# Patient Record
Sex: MALE | Race: WHITE | Employment: UNEMPLOYED | URBAN - METROPOLITAN AREA
[De-identification: names, ages, dates, MRNs, and addresses within clinical notes are randomized per-mention and may not be internally consistent; named-entity substitution may affect disease eponyms.]

---

## 2018-03-12 PROBLEM — J30.1 NON-SEASONAL ALLERGIC RHINITIS DUE TO POLLEN: Status: ACTIVE | Noted: 2018-03-12

## 2018-07-24 ENCOUNTER — APPOINTMENT (OUTPATIENT)
Dept: GENERAL RADIOLOGY | Age: 48
End: 2018-07-24
Attending: EMERGENCY MEDICINE
Payer: COMMERCIAL

## 2018-07-24 ENCOUNTER — HOSPITAL ENCOUNTER (EMERGENCY)
Age: 48
Discharge: HOME | End: 2018-07-24
Attending: EMERGENCY MEDICINE
Payer: COMMERCIAL

## 2018-07-24 VITALS
RESPIRATION RATE: 18 BRPM | WEIGHT: 205 LBS | OXYGEN SATURATION: 98 % | DIASTOLIC BLOOD PRESSURE: 107 MMHG | TEMPERATURE: 97 F | SYSTOLIC BLOOD PRESSURE: 172 MMHG | HEART RATE: 61 BPM | HEIGHT: 73 IN | BODY MASS INDEX: 27.17 KG/M2

## 2018-07-24 DIAGNOSIS — N20.0 KIDNEY STONE: Primary | ICD-10-CM

## 2018-07-24 LAB
ALBUMIN SERPL-MCNC: 4.2 G/DL (ref 3.5–4.8)
ALBUMIN/GLOB SERPL: 1.4 {RATIO} (ref 1–2)
ALP LIVER SERPL-CCNC: 47 U/L (ref 45–117)
ALT SERPL-CCNC: 27 U/L (ref 17–63)
ANION GAP SERPL CALC-SCNC: 8 MMOL/L (ref 0–18)
AST SERPL-CCNC: 16 U/L (ref 15–41)
BASOPHILS # BLD AUTO: 0.06 X10(3) UL (ref 0–0.1)
BASOPHILS NFR BLD AUTO: 0.8 %
BILIRUB SERPL-MCNC: 0.5 MG/DL (ref 0.1–2)
BILIRUB UR QL STRIP.AUTO: NEGATIVE
BUN BLD-MCNC: 14 MG/DL (ref 8–20)
BUN/CREAT SERPL: 12.8 (ref 10–20)
CALCIUM BLD-MCNC: 8.9 MG/DL (ref 8.3–10.3)
CHLORIDE SERPL-SCNC: 104 MMOL/L (ref 101–111)
CO2 SERPL-SCNC: 26 MMOL/L (ref 22–32)
COLOR UR AUTO: YELLOW
CREAT BLD-MCNC: 1.09 MG/DL (ref 0.7–1.3)
EOSINOPHIL # BLD AUTO: 0.29 X10(3) UL (ref 0–0.3)
EOSINOPHIL NFR BLD AUTO: 3.8 %
ERYTHROCYTE [DISTWIDTH] IN BLOOD BY AUTOMATED COUNT: 11.6 % (ref 11.5–16)
GLOBULIN PLAS-MCNC: 3.1 G/DL (ref 2.5–3.7)
GLUCOSE BLD-MCNC: 125 MG/DL (ref 70–99)
GLUCOSE UR STRIP.AUTO-MCNC: NEGATIVE MG/DL
HCT VFR BLD AUTO: 47 % (ref 37–53)
HGB BLD-MCNC: 16.3 G/DL (ref 13–17)
IMMATURE GRANULOCYTE COUNT: 0.04 X10(3) UL (ref 0–1)
IMMATURE GRANULOCYTE RATIO %: 0.5 %
LEUKOCYTE ESTERASE UR QL STRIP.AUTO: NEGATIVE
LYMPHOCYTES # BLD AUTO: 2.34 X10(3) UL (ref 0.9–4)
LYMPHOCYTES NFR BLD AUTO: 30.5 %
M PROTEIN MFR SERPL ELPH: 7.3 G/DL (ref 6.1–8.3)
MCH RBC QN AUTO: 29.1 PG (ref 27–33.2)
MCHC RBC AUTO-ENTMCNC: 34.7 G/DL (ref 31–37)
MCV RBC AUTO: 83.9 FL (ref 80–99)
MONOCYTES # BLD AUTO: 0.78 X10(3) UL (ref 0.1–1)
MONOCYTES NFR BLD AUTO: 10.2 %
NEUTROPHIL ABS PRELIM: 4.15 X10 (3) UL (ref 1.3–6.7)
NEUTROPHILS # BLD AUTO: 4.15 X10(3) UL (ref 1.3–6.7)
NEUTROPHILS NFR BLD AUTO: 54.2 %
NITRITE UR QL STRIP.AUTO: NEGATIVE
OSMOLALITY SERPL CALC.SUM OF ELEC: 288 MOSM/KG (ref 275–295)
PH UR STRIP.AUTO: 5.5 [PH] (ref 4.5–8)
PLATELET # BLD AUTO: 266 10(3)UL (ref 150–450)
POTASSIUM SERPL-SCNC: 3.7 MMOL/L (ref 3.6–5.1)
RBC # BLD AUTO: 5.6 X10(6)UL (ref 4.3–5.7)
RBC #/AREA URNS AUTO: >10 /HPF
RED CELL DISTRIBUTION WIDTH-SD: 35.2 FL (ref 35.1–46.3)
SODIUM SERPL-SCNC: 138 MMOL/L (ref 136–144)
SP GR UR STRIP.AUTO: 1.02 (ref 1–1.03)
UROBILINOGEN UR STRIP.AUTO-MCNC: 0.2 MG/DL
WBC # BLD AUTO: 7.7 X10(3) UL (ref 4–13)

## 2018-07-24 PROCEDURE — 99284 EMERGENCY DEPT VISIT MOD MDM: CPT

## 2018-07-24 PROCEDURE — 81001 URINALYSIS AUTO W/SCOPE: CPT | Performed by: EMERGENCY MEDICINE

## 2018-07-24 PROCEDURE — 74018 RADEX ABDOMEN 1 VIEW: CPT | Performed by: EMERGENCY MEDICINE

## 2018-07-24 PROCEDURE — 96374 THER/PROPH/DIAG INJ IV PUSH: CPT

## 2018-07-24 PROCEDURE — 96375 TX/PRO/DX INJ NEW DRUG ADDON: CPT

## 2018-07-24 PROCEDURE — 85025 COMPLETE CBC W/AUTO DIFF WBC: CPT | Performed by: EMERGENCY MEDICINE

## 2018-07-24 PROCEDURE — 96376 TX/PRO/DX INJ SAME DRUG ADON: CPT

## 2018-07-24 PROCEDURE — 96361 HYDRATE IV INFUSION ADD-ON: CPT

## 2018-07-24 PROCEDURE — 80053 COMPREHEN METABOLIC PANEL: CPT | Performed by: EMERGENCY MEDICINE

## 2018-07-24 RX ORDER — KETOROLAC TROMETHAMINE 30 MG/ML
30 INJECTION, SOLUTION INTRAMUSCULAR; INTRAVENOUS ONCE
Status: COMPLETED | OUTPATIENT
Start: 2018-07-24 | End: 2018-07-24

## 2018-07-24 RX ORDER — TRAMADOL HYDROCHLORIDE 50 MG/1
50 TABLET ORAL EVERY 6 HOURS PRN
Qty: 10 TABLET | Refills: 0 | Status: SHIPPED | OUTPATIENT
Start: 2018-07-24 | End: 2018-07-27

## 2018-07-24 RX ORDER — MORPHINE SULFATE 10 MG/ML
10 INJECTION, SOLUTION INTRAMUSCULAR; INTRAVENOUS EVERY 30 MIN PRN
Status: DISCONTINUED | OUTPATIENT
Start: 2018-07-24 | End: 2018-07-24

## 2018-07-24 RX ORDER — ONDANSETRON 4 MG/1
4 TABLET, ORALLY DISINTEGRATING ORAL EVERY 4 HOURS PRN
Qty: 20 TABLET | Refills: 0 | Status: SHIPPED | OUTPATIENT
Start: 2018-07-24 | End: 2018-07-29

## 2018-07-24 RX ORDER — ONDANSETRON 2 MG/ML
4 INJECTION INTRAMUSCULAR; INTRAVENOUS ONCE
Status: COMPLETED | OUTPATIENT
Start: 2018-07-24 | End: 2018-07-24

## 2018-07-24 NOTE — PROGRESS NOTES
Future Appointments  Date Time Provider Henry Bowling   7/30/2018 3:45 PM [facility]

## 2018-07-24 NOTE — ED PROVIDER NOTES
Patient Seen in: THE [facility] Emergency Department In [place]    History   Patient presents with:  Abdomen/Flank Pain (GI/)    Stated Complaint: flank    HPI    Patient is a 40-year-old male comes emergency room for evaluation of flank pain.   Symptoms sta HEENT: Normocephalic, atraumatic. Moist mucous membranes. Pupils equal round reactive to light and accommodation, extraocular motion is intact, sclerae white, conjunctiva is pink. Oropharynx is unremarkable, no exudate.   NECK: Supple, trachea midline, results for these tests on the individual orders.    RAINBOW DRAW BLUE   RAINBOW DRAW LAVENDER   RAINBOW DRAW LIGHT GREEN   RAINBOW DRAW GOLD   CBC W/ DIFFERENTIAL     KUB showed 3-4 mm calculus pelvis possible kidney stone  ED Course as of Jul 24 0538  --- Clinical Impression:  Kidney stone  (primary encounter diagnosis)    Disposition:  Discharge  7/24/2018  4:29 am    Follow-up:  Jurgen Dobbins, [address] [address]  [phone]      As needed    Arianna Hernandez MD  [address] [facility]

## 2018-09-27 PROBLEM — I10 ESSENTIAL HYPERTENSION: Status: ACTIVE | Noted: 2018-09-27

## 2019-05-20 PROBLEM — E78.5 HYPERLIPIDEMIA: Status: ACTIVE | Noted: 2019-05-20

## 2021-04-20 NOTE — ED INITIAL ASSESSMENT (HPI)
PT STS RIGHT FLANK PAIN X 1 HOUR PRIOR TO ARRIVAL WITH NAUSEA. STS HISTORY OF SIMILAR PAIN WITH DIAGNOSIS OF RENAL STONES.  DENIES URINARY C/O. home

## 2021-06-21 PROBLEM — G43.809 OTHER MIGRAINE WITHOUT STATUS MIGRAINOSUS, NOT INTRACTABLE: Status: ACTIVE | Noted: 2021-06-21

## 2021-06-21 PROBLEM — N50.82 PAIN IN SCROTUM WITHOUT TRAUMA OF SCROTUM: Status: ACTIVE | Noted: 2021-06-21

## 2021-07-06 PROBLEM — N50.3 CYST OF EPIDIDYMIS: Status: ACTIVE | Noted: 2021-07-06

## 2021-07-06 PROBLEM — L72.0 EPIDERMAL CYST: Status: ACTIVE | Noted: 2021-07-06

## 2021-07-06 PROBLEM — N20.0 NEPHROLITHIASIS: Status: ACTIVE | Noted: 2021-07-06

## 2021-07-06 PROBLEM — N43.3 HYDROCELE, UNSPECIFIED HYDROCELE TYPE: Status: ACTIVE | Noted: 2021-07-06

## 2022-01-20 PROBLEM — N50.82 PAIN IN SCROTUM WITHOUT TRAUMA OF SCROTUM: Status: RESOLVED | Noted: 2021-06-21 | Resolved: 2022-01-20

## 2022-07-28 ENCOUNTER — HOSPITAL ENCOUNTER (EMERGENCY)
Age: 52
Discharge: HOME | End: 2022-07-28
Attending: EMERGENCY MEDICINE
Payer: COMMERCIAL

## 2022-07-28 VITALS
RESPIRATION RATE: 18 BRPM | DIASTOLIC BLOOD PRESSURE: 69 MMHG | HEART RATE: 87 BPM | TEMPERATURE: 98 F | WEIGHT: 180 LBS | BODY MASS INDEX: 24.38 KG/M2 | OXYGEN SATURATION: 99 % | HEIGHT: 72 IN | SYSTOLIC BLOOD PRESSURE: 127 MMHG

## 2022-07-28 DIAGNOSIS — U07.1 COVID-19: Primary | ICD-10-CM

## 2022-07-28 PROCEDURE — 99283 EMERGENCY DEPT VISIT LOW MDM: CPT

## 2022-07-28 RX ORDER — LORATADINE 10 MG/1
10 TABLET ORAL DAILY
COMMUNITY

## 2022-07-28 RX ORDER — NIRMATRELVIR AND RITONAVIR 300-100 MG
KIT ORAL
Qty: 30 TABLET | Refills: 0 | Status: SHIPPED | OUTPATIENT
Start: 2022-07-28 | End: 2022-08-02

## 2022-07-28 NOTE — ED PROVIDER NOTES
Patient Seen in: THE [facility] Emergency Department In [place]      History   Patient presents with:  Covid    Stated Complaint: states tested positive for covid this morning, states having back pain, a sore *    Subjective:   HPI    54-year-old male presents for evaluation of COVID-like symptoms. Patient developed cough, scratchy throat, body aches and fatigue yesterday. He tested positive this morning via a home test.  Denies chest pain or shortness of breath. No vomiting, slight diarrhea. Past medical history of hypertension high cholesterol    Objective:   No pertinent past medical history. No pertinent past surgical history. No pertinent social history. Review of Systems    Positive for stated complaint: states tested positive for covid this morning, states having back pain, a sore *  Other systems are as noted in HPI. Constitutional and vital signs reviewed. All other systems reviewed and negative except as noted above. Physical Exam     ED Triage Vitals   BP    Pulse    Resp    Temp    Temp src    SpO2    O2 Device        Current:There were no vitals taken for this visit. Physical Exam    General: Alert, oriented, no apparent distress  HEENT: Atraumatic, normocephalic. Pupils equal reactive. Extraocular motions intact. Oropharynx clear. Neck: Supple  Lungs: Clear to auscultation bilaterally. Heart: Regular rate and rhythm. Abdomen: Soft, nontender. Skin: No rash. No edema. Neurologic: No focal neurologic deficits. Normal speech pattern  Musculoskeletal: No tenderness or deformity noted. ED Course   Labs Reviewed - No data to display             MDM      Well-appearing. No tachypnea nor hypoxia. Paxlovid will be prescribed. Return precautions reviewed, quarantine discussed.   Home pulse oximeter and incentive spirometer provided                                   Disposition and Plan     Clinical Impression:  COVID-19  (primary encounter diagnosis)     Disposition:  There is no disposition on file for this visit. There is no disposition time on file for this visit. Follow-up:  Nura Tapia, [facility]  [phone]    Call  As needed      The patient has evidence of COVID-19 pneumonia and low oxygen saturation. There is concern for gradual decline at home. As a result, a pulse oximetry device was given to the patient/caregiver and clear instructions relayed on how to use the device, interpret the results and when to return if worse. The patient/caregiver verbalized understanding of the instructions. Medications Prescribed:  Current Discharge Medication List    START taking these medications    nirmatrelvir & ritonavir (PAXLOVID) 20 x 150 MG & 10 x 100MG Oral Tablet Therapy Pack  Take two nirmatrelvir tablets (300mg) with one ritonavir tablet (100mg) together twice daily for 5 days.   Qty: 30 tablet Refills: 0

## 2023-11-18 ENCOUNTER — APPOINTMENT (OUTPATIENT)
Dept: CT IMAGING | Age: 53
End: 2023-11-18
Attending: EMERGENCY MEDICINE
Payer: COMMERCIAL

## 2023-11-18 ENCOUNTER — HOSPITAL ENCOUNTER (EMERGENCY)
Age: 53
Discharge: HOME | End: 2023-11-18
Attending: EMERGENCY MEDICINE
Payer: COMMERCIAL

## 2023-11-18 VITALS
RESPIRATION RATE: 17 BRPM | WEIGHT: 190 LBS | DIASTOLIC BLOOD PRESSURE: 81 MMHG | BODY MASS INDEX: 25.73 KG/M2 | HEIGHT: 72 IN | TEMPERATURE: 98 F | HEART RATE: 66 BPM | SYSTOLIC BLOOD PRESSURE: 143 MMHG | OXYGEN SATURATION: 99 %

## 2023-11-18 DIAGNOSIS — N23 RENAL COLIC: Primary | ICD-10-CM

## 2023-11-18 LAB
ALBUMIN SERPL-MCNC: 3.5 G/DL (ref 3.4–5)
ALBUMIN/GLOB SERPL: 1 {RATIO} (ref 1–2)
ALP LIVER SERPL-CCNC: 41 U/L
ALT SERPL-CCNC: 32 U/L
ANION GAP SERPL CALC-SCNC: 5 MMOL/L (ref 0–18)
AST SERPL-CCNC: 29 U/L (ref 15–37)
BASOPHILS # BLD AUTO: 0.05 X10(3) UL (ref 0–0.2)
BASOPHILS NFR BLD AUTO: 0.5 %
BILIRUB SERPL-MCNC: 0.5 MG/DL (ref 0.1–2)
BUN BLD-MCNC: 20 MG/DL (ref 9–23)
CALCIUM BLD-MCNC: 8.8 MG/DL (ref 8.5–10.1)
CHLORIDE SERPL-SCNC: 107 MMOL/L (ref 98–112)
CO2 SERPL-SCNC: 26 MMOL/L (ref 21–32)
CREAT BLD-MCNC: 1.42 MG/DL
EGFRCR SERPLBLD CKD-EPI 2021: 59 ML/MIN/1.73M2 (ref 60–?)
EOSINOPHIL # BLD AUTO: 0.23 X10(3) UL (ref 0–0.7)
EOSINOPHIL NFR BLD AUTO: 2.2 %
ERYTHROCYTE [DISTWIDTH] IN BLOOD BY AUTOMATED COUNT: 11.2 %
GLOBULIN PLAS-MCNC: 3.4 G/DL (ref 2.8–4.4)
GLUCOSE BLD-MCNC: 112 MG/DL (ref 70–99)
HCT VFR BLD AUTO: 43.8 %
HGB BLD-MCNC: 15.3 G/DL
IMM GRANULOCYTES # BLD AUTO: 0.11 X10(3) UL (ref 0–1)
IMM GRANULOCYTES NFR BLD: 1.1 %
LIPASE SERPL-CCNC: 59 U/L (ref 13–75)
LYMPHOCYTES # BLD AUTO: 1.69 X10(3) UL (ref 1–4)
LYMPHOCYTES NFR BLD AUTO: 16.2 %
MCH RBC QN AUTO: 30.2 PG (ref 26–34)
MCHC RBC AUTO-ENTMCNC: 34.9 G/DL (ref 31–37)
MCV RBC AUTO: 86.4 FL
MONOCYTES # BLD AUTO: 0.99 X10(3) UL (ref 0.1–1)
MONOCYTES NFR BLD AUTO: 9.5 %
NEUTROPHILS # BLD AUTO: 7.36 X10 (3) UL (ref 1.5–7.7)
NEUTROPHILS # BLD AUTO: 7.36 X10(3) UL (ref 1.5–7.7)
NEUTROPHILS NFR BLD AUTO: 70.5 %
OSMOLALITY SERPL CALC.SUM OF ELEC: 289 MOSM/KG (ref 275–295)
PLATELET # BLD AUTO: 239 10(3)UL (ref 150–450)
POTASSIUM SERPL-SCNC: 4.7 MMOL/L (ref 3.5–5.1)
PROT SERPL-MCNC: 6.9 G/DL (ref 6.4–8.2)
RBC # BLD AUTO: 5.07 X10(6)UL
SODIUM SERPL-SCNC: 138 MMOL/L (ref 136–145)
WBC # BLD AUTO: 10.4 X10(3) UL (ref 4–11)

## 2023-11-18 PROCEDURE — 99284 EMERGENCY DEPT VISIT MOD MDM: CPT

## 2023-11-18 PROCEDURE — 96374 THER/PROPH/DIAG INJ IV PUSH: CPT

## 2023-11-18 PROCEDURE — 80053 COMPREHEN METABOLIC PANEL: CPT | Performed by: EMERGENCY MEDICINE

## 2023-11-18 PROCEDURE — 99285 EMERGENCY DEPT VISIT HI MDM: CPT

## 2023-11-18 PROCEDURE — 74176 CT ABD & PELVIS W/O CONTRAST: CPT | Performed by: EMERGENCY MEDICINE

## 2023-11-18 PROCEDURE — 85025 COMPLETE CBC W/AUTO DIFF WBC: CPT | Performed by: EMERGENCY MEDICINE

## 2023-11-18 PROCEDURE — 83690 ASSAY OF LIPASE: CPT | Performed by: EMERGENCY MEDICINE

## 2023-11-18 RX ORDER — TAMSULOSIN HYDROCHLORIDE 0.4 MG/1
0.4 CAPSULE ORAL DAILY
Qty: 7 CAPSULE | Refills: 0 | Status: SHIPPED | OUTPATIENT
Start: 2023-11-18 | End: 2023-11-25

## 2023-11-18 RX ORDER — ONDANSETRON 4 MG/1
4 TABLET, ORALLY DISINTEGRATING ORAL EVERY 4 HOURS PRN
Qty: 10 TABLET | Refills: 0 | Status: SHIPPED | OUTPATIENT
Start: 2023-11-18 | End: 2023-11-25

## 2023-11-18 RX ORDER — HYDROCODONE BITARTRATE AND ACETAMINOPHEN 5; 325 MG/1; MG/1
1-2 TABLET ORAL EVERY 6 HOURS PRN
Qty: 10 TABLET | Refills: 0 | Status: SHIPPED | OUTPATIENT
Start: 2023-11-18 | End: 2023-11-23

## 2023-11-18 RX ORDER — KETOROLAC TROMETHAMINE 15 MG/ML
30 INJECTION, SOLUTION INTRAMUSCULAR; INTRAVENOUS ONCE
Status: COMPLETED | OUTPATIENT
Start: 2023-11-18 | End: 2023-11-18

## 2023-11-18 RX ORDER — HYDROCODONE BITARTRATE AND ACETAMINOPHEN 5; 325 MG/1; MG/1
1 TABLET ORAL ONCE
Status: COMPLETED | OUTPATIENT
Start: 2023-11-18 | End: 2023-11-18

## 2023-11-18 NOTE — DISCHARGE INSTRUCTIONS
Follow-up with [facility] SPECIALTY [facility].   Call to make a follow up appointment  Strain all urine until stone passed  Take norco 1 tablet every 6 hours as needed for pain  Take zofran as needed for nausea every 4 hours  Take flomax once a day until stone passed  Return to ER if any worsening pain, fever, or any new concern

## 2024-03-06 ENCOUNTER — APPOINTMENT (OUTPATIENT)
Dept: CT IMAGING | Age: 54
End: 2024-03-06
Attending: NURSE PRACTITIONER
Payer: COMMERCIAL

## 2024-03-06 ENCOUNTER — HOSPITAL ENCOUNTER (EMERGENCY)
Age: 54
Discharge: HOME | End: 2024-03-06
Attending: EMERGENCY MEDICINE
Payer: COMMERCIAL

## 2024-03-06 VITALS
TEMPERATURE: 97 F | RESPIRATION RATE: 16 BRPM | HEIGHT: 72 IN | HEART RATE: 83 BPM | WEIGHT: 200 LBS | SYSTOLIC BLOOD PRESSURE: 112 MMHG | OXYGEN SATURATION: 98 % | BODY MASS INDEX: 27.09 KG/M2 | DIASTOLIC BLOOD PRESSURE: 77 MMHG

## 2024-03-06 DIAGNOSIS — N23 RENAL COLIC: ICD-10-CM

## 2024-03-06 DIAGNOSIS — N20.1 URETEROLITHIASIS: Primary | ICD-10-CM

## 2024-03-06 LAB
ALBUMIN SERPL-MCNC: 4.3 G/DL (ref 3.4–5)
ALBUMIN/GLOB SERPL: 1.3 {RATIO} (ref 1–2)
ALP LIVER SERPL-CCNC: 44 U/L
ALT SERPL-CCNC: 31 U/L
ANION GAP SERPL CALC-SCNC: 3 MMOL/L (ref 0–18)
AST SERPL-CCNC: 19 U/L (ref 15–37)
BASOPHILS # BLD AUTO: 0.06 X10(3) UL (ref 0–0.2)
BASOPHILS NFR BLD AUTO: 0.5 %
BILIRUB SERPL-MCNC: 0.6 MG/DL (ref 0.1–2)
BUN BLD-MCNC: 16 MG/DL (ref 9–23)
CALCIUM BLD-MCNC: 9.7 MG/DL (ref 8.5–10.1)
CHLORIDE SERPL-SCNC: 105 MMOL/L (ref 98–112)
CO2 SERPL-SCNC: 26 MMOL/L (ref 21–32)
CREAT BLD-MCNC: 1.2 MG/DL
EGFRCR SERPLBLD CKD-EPI 2021: 72 ML/MIN/1.73M2 (ref 60–?)
EOSINOPHIL # BLD AUTO: 0.09 X10(3) UL (ref 0–0.7)
EOSINOPHIL NFR BLD AUTO: 0.8 %
ERYTHROCYTE [DISTWIDTH] IN BLOOD BY AUTOMATED COUNT: 11.5 %
GLOBULIN PLAS-MCNC: 3.2 G/DL (ref 2.8–4.4)
GLUCOSE BLD-MCNC: 134 MG/DL (ref 70–99)
HCT VFR BLD AUTO: 47 %
HGB BLD-MCNC: 16.5 G/DL
IMM GRANULOCYTES # BLD AUTO: 0.11 X10(3) UL (ref 0–1)
IMM GRANULOCYTES NFR BLD: 0.9 %
LYMPHOCYTES # BLD AUTO: 1.2 X10(3) UL (ref 1–4)
LYMPHOCYTES NFR BLD AUTO: 10 %
MCH RBC QN AUTO: 30.2 PG (ref 26–34)
MCHC RBC AUTO-ENTMCNC: 35.1 G/DL (ref 31–37)
MCV RBC AUTO: 85.9 FL
MONOCYTES # BLD AUTO: 0.65 X10(3) UL (ref 0.1–1)
MONOCYTES NFR BLD AUTO: 5.4 %
NEUTROPHILS # BLD AUTO: 9.84 X10 (3) UL (ref 1.5–7.7)
NEUTROPHILS # BLD AUTO: 9.84 X10(3) UL (ref 1.5–7.7)
NEUTROPHILS NFR BLD AUTO: 82.4 %
OSMOLALITY SERPL CALC.SUM OF ELEC: 281 MOSM/KG (ref 275–295)
PLATELET # BLD AUTO: 253 10(3)UL (ref 150–450)
POTASSIUM SERPL-SCNC: 4.2 MMOL/L (ref 3.5–5.1)
PROT SERPL-MCNC: 7.5 G/DL (ref 6.4–8.2)
RBC # BLD AUTO: 5.47 X10(6)UL
RBC #/AREA URNS AUTO: >10 /HPF
SODIUM SERPL-SCNC: 134 MMOL/L (ref 136–145)
WBC # BLD AUTO: 12 X10(3) UL (ref 4–11)

## 2024-03-06 PROCEDURE — 96374 THER/PROPH/DIAG INJ IV PUSH: CPT

## 2024-03-06 PROCEDURE — 74176 CT ABD & PELVIS W/O CONTRAST: CPT | Performed by: NURSE PRACTITIONER

## 2024-03-06 PROCEDURE — 96361 HYDRATE IV INFUSION ADD-ON: CPT

## 2024-03-06 PROCEDURE — 81001 URINALYSIS AUTO W/SCOPE: CPT | Performed by: EMERGENCY MEDICINE

## 2024-03-06 PROCEDURE — 81015 MICROSCOPIC EXAM OF URINE: CPT | Performed by: EMERGENCY MEDICINE

## 2024-03-06 PROCEDURE — 96375 TX/PRO/DX INJ NEW DRUG ADDON: CPT

## 2024-03-06 PROCEDURE — 87086 URINE CULTURE/COLONY COUNT: CPT | Performed by: EMERGENCY MEDICINE

## 2024-03-06 PROCEDURE — 96376 TX/PRO/DX INJ SAME DRUG ADON: CPT

## 2024-03-06 PROCEDURE — 99285 EMERGENCY DEPT VISIT HI MDM: CPT

## 2024-03-06 PROCEDURE — 85025 COMPLETE CBC W/AUTO DIFF WBC: CPT | Performed by: NURSE PRACTITIONER

## 2024-03-06 PROCEDURE — 80053 COMPREHEN METABOLIC PANEL: CPT | Performed by: NURSE PRACTITIONER

## 2024-03-06 PROCEDURE — 99284 EMERGENCY DEPT VISIT MOD MDM: CPT

## 2024-03-06 RX ORDER — HYDROCODONE BITARTRATE AND ACETAMINOPHEN 5; 325 MG/1; MG/1
1-2 TABLET ORAL EVERY 6 HOURS PRN
Qty: 30 TABLET | Refills: 0 | Status: SHIPPED | OUTPATIENT
Start: 2024-03-06 | End: 2024-03-11

## 2024-03-06 RX ORDER — KETOROLAC TROMETHAMINE 15 MG/ML
15 INJECTION, SOLUTION INTRAMUSCULAR; INTRAVENOUS ONCE
Status: COMPLETED | OUTPATIENT
Start: 2024-03-06 | End: 2024-03-06

## 2024-03-06 RX ORDER — HYDROMORPHONE HYDROCHLORIDE 1 MG/ML
1 INJECTION, SOLUTION INTRAMUSCULAR; INTRAVENOUS; SUBCUTANEOUS ONCE
Status: COMPLETED | OUTPATIENT
Start: 2024-03-06 | End: 2024-03-06

## 2024-03-06 RX ORDER — ONDANSETRON 4 MG/1
4 TABLET, ORALLY DISINTEGRATING ORAL EVERY 4 HOURS PRN
Qty: 10 TABLET | Refills: 0 | Status: SHIPPED | OUTPATIENT
Start: 2024-03-06 | End: 2024-03-13

## 2024-03-06 RX ORDER — TAMSULOSIN HYDROCHLORIDE 0.4 MG/1
0.4 CAPSULE ORAL DAILY
Qty: 7 CAPSULE | Refills: 0 | Status: SHIPPED | OUTPATIENT
Start: 2024-03-06 | End: 2024-03-13

## 2024-03-06 RX ORDER — ONDANSETRON 2 MG/ML
4 INJECTION INTRAMUSCULAR; INTRAVENOUS ONCE
Status: COMPLETED | OUTPATIENT
Start: 2024-03-06 | End: 2024-03-06

## 2024-03-06 RX ORDER — HYDROMORPHONE HYDROCHLORIDE 1 MG/ML
0.5 INJECTION, SOLUTION INTRAMUSCULAR; INTRAVENOUS; SUBCUTANEOUS ONCE
Status: COMPLETED | OUTPATIENT
Start: 2024-03-06 | End: 2024-03-06

## 2024-03-06 NOTE — ED INITIAL ASSESSMENT (HPI)
Left flank and back pain with radiation to left testicle. Also with chills and vomiting started early this morning

## 2024-03-06 NOTE — ED PROVIDER NOTES
Patient Seen in: [facility] In [place]      History     Chief Complaint   Patient presents with    Abdomen/Flank Pain    Nausea/Vomiting/Diarrhea     Stated Complaint: N/V; left flank pain radiating into testicle-started at 0700; hx of kidney ston*    Subjective:   53-year-old male presents to the emergency department with complaint of left flank pain.  Patient states around 7:00 this morning he started having some sharp left flank pain.  He has had some nausea and vomiting with this as well as pain radiating into his testicles.  He states he does have a prior history of kidney stones, but the vomiting and testicular pain was new.  Patient did receive Toradol and Zofran prior to my exam, he states this has helped some but pain is still pretty severe.  He states he also had some chills, but denied any fever.  He states he did have some difficulty urinating and was only dribbling at times.  He denies any dysuria, hematuria, urgency, frequency, diarrhea, or abdominal pain.    The history is provided by the patient.         Objective:   Past Medical History:   Diagnosis Date    Arthritis     Kidney stones     Migraines     Osteoarthritis of knee 2011    Pain in scrotum without trauma of scrotum 2021    Sinus disorder 2016              No pertinent past surgical history.              Social History     Socioeconomic History    Marital status:    Tobacco Use    Smoking status: Former     Types: Cigarettes     Quit date: 2005     Years since quittin.3    Smokeless tobacco: Never    Tobacco comments:     QUIT 9 YEARS AGO   Vaping Use    Vaping Use: Never used   Substance and Sexual Activity    Alcohol use: No     Alcohol/week: 0.0 standard drinks of alcohol    Drug use: No              Review of Systems   Constitutional:  Positive for chills. Negative for fever.   Gastrointestinal:  Positive for nausea and vomiting. Negative for abdominal pain and diarrhea.    Genitourinary:  Positive for difficulty urinating, flank pain and testicular pain. Negative for dysuria, frequency and hematuria.   Musculoskeletal:  Positive for back pain.   All other systems reviewed and are negative.      Positive for stated complaint: N/V; left flank pain radiating into testicle-started at 0700; hx of kidney ston*  Other systems are as noted in HPI.  Constitutional and vital signs reviewed.      All other systems reviewed and negative except as noted above.    Physical Exam     ED Triage Vitals [03/06/24 1227]   BP (!) 157/91   Pulse 57   Resp 16   Temp 97.4 °F (36.3 °C)   Temp src Temporal   SpO2 98 %   O2 Device None (Room air)       Current:/77   Pulse 83   Temp 97.4 °F (36.3 °C) (Temporal)   Resp 16   Ht 182.9 cm (6')   Wt 90.7 kg   SpO2 98%   BMI 27.12 kg/m²         Physical Exam  Vitals and nursing note reviewed.   Constitutional:       General: He is in acute distress.      Appearance: He is well-developed and normal weight. He is not ill-appearing.   HENT:      Head: Normocephalic and atraumatic.      Mouth/Throat:      Mouth: Mucous membranes are moist.      Pharynx: Oropharynx is clear.   Eyes:      Pupils: Pupils are equal, round, and reactive to light.   Cardiovascular:      Rate and Rhythm: Normal rate and regular rhythm.      Heart sounds: Normal heart sounds.   Pulmonary:      Effort: Pulmonary effort is normal. No respiratory distress.      Breath sounds: Normal breath sounds.   Abdominal:      General: Abdomen is protuberant. Bowel sounds are normal.      Palpations: Abdomen is soft.      Tenderness: There is no abdominal tenderness. There is right CVA tenderness and left CVA tenderness.   Skin:     General: Skin is warm and dry.      Coloration: Skin is pale.   Neurological:      General: No focal deficit present.      Mental Status: He is alert and oriented to person, place, and time.   Psychiatric:         Mood and Affect: Mood normal.         Behavior:  Behavior normal.           ED Course     Labs Reviewed   URINALYSIS WITH CULTURE REFLEX - Abnormal; Notable for the following components:       Result Value    Urine Color Orange (*)     Clarity Urine Cloudy (*)     All other components within normal limits   COMP METABOLIC PANEL (14) - Abnormal; Notable for the following components:    Glucose 134 (*)     Sodium 134 (*)     Alkaline Phosphatase 44 (*)     All other components within normal limits   UA MICROSCOPIC ONLY, URINE - Abnormal; Notable for the following components:    RBC Urine >10 (*)     Bacteria Urine 1+ (*)     Ca Oxalate Crystals Few (*)     All other components within normal limits   CBC W/ DIFFERENTIAL - Abnormal; Notable for the following components:    WBC 12.0 (*)     Neutrophil Absolute Prelim 9.84 (*)     Neutrophil Absolute 9.84 (*)     All other components within normal limits   CBC WITH DIFFERENTIAL WITH PLATELET    Narrative:     The following orders were created for panel order CBC With Differential With Platelet.  Procedure                               Abnormality         Status                     ---------                               -----------         ------                     CBC W/ DIFFERENTIAL[[id number]]          Abnormal            Final result                 Please view results for these tests on the individual orders.   RAINBOW DRAW LAVENDER   RAINBOW DRAW LIGHT GREEN   URINE CULTURE, ROUTINE          ED Course as of 03/06/24 1456  ------------------------------------------------------------  Time: 03/06 1337  Value: CBC With Differential With Platelet(!)  Comment: WBC of 12 with leukocytosis.  H&H is stable at 16.5 and 47.0.  ------------------------------------------------------------  Time: 03/06 1337  Value: Comp Metabolic Panel (14)(!)  Comment: Unremarkable and essentially normal.  Kidney function is normal.  ------------------------------------------------------------  Time: 03/06 5192  Value: Urinalysis with Culture  Reflex(!)  Comment: Unable to run urinalysis due to color and cloudiness.  Will await microscopic.  ------------------------------------------------------------  Time: 03/06 1434  Value: CT ABDOMEN+PELVIS KIDNEYSTONE 2D RNDR(NO IV,NO ORAL)(CPT=74176)  Comment: Impression  CONCLUSION:    1. There is a 6 mm calculus within the distal left ureter causing mild left-sided hydronephrosis.  2. Nonobstructing right renal calculi.  3. Colonic diverticulosis without evidence of diverticulitis.  4. Benign appearing lesions within the dome of the liver.    ------------------------------------------------------------  Time: 03/06 1440  Value: UA Microscopic only, urine(!)  Comment: Positive for 1+ bacteria.  Few calcium oxide crystals.             [facility]                               Medical Decision Making  52 yo male with left flank pain that started this morning.  Prior hx. Of stones.  CBC, CMP, UA, IV fluids, Zofran, Toradol, Dilaudid and CT stone ordered.     CT shows 6 mm stone to the left ureter with hydronephrosis.  Patient does have a mildly elevated WBC with leukocytosis.  All other labs unremarkable and essentially normal.  Urine was positive for blood.  Results given to patient and spouse.  We did discuss admission for pain control versus home with medications.  Patient states at this time he does feel better and would like to try it at home.  Patient is a good understanding that should anything change or worsen, or pain is not managed by medications at home to go to [facility] for admission.  Patient states he will call his urologist today to get an appointment as soon as possible.  No evidence of sepsis, pyelonephritis, infected stone, or UTI.    Amount and/or Complexity of Data Reviewed  Labs: ordered. Decision-making details documented in ED Course.  Radiology: ordered. Decision-making details documented in ED Course.    Risk  Prescription drug management.        Disposition and Plan     Clinical Impression:  1.  Ureterolithiasis    2. Renal colic         Disposition:  Discharge  3/6/2024  2:56 pm    Follow-up:  Alfred Butt MD  [address]  [facility] [id number] [phone]    Follow up      Angel Cardona MD  [address]  [facility] [address]  [phone]    Follow up  As needed          Medications Prescribed:  Current Discharge Medication List        START taking these medications    Details   ondansetron 4 MG Oral Tablet Dispersible Take 1 tablet (4 mg total) by mouth every 4 (four) hours as needed for Nausea.  Qty: 10 tablet, Refills: 0      tamsulosin (FLOMAX) 0.4 MG Oral Cap Take 1 capsule (0.4 mg total) by mouth daily for 7 days.  Qty: 7 capsule, Refills: 0      HYDROcodone-acetaminophen 5-325 MG Oral Tab Take 1-2 tablets by mouth every 6 (six) hours as needed for Pain.  Qty: 30 tablet, Refills: 0    Associated Diagnoses: Ureterolithiasis; Renal colic

## 2024-03-06 NOTE — DISCHARGE INSTRUCTIONS
Rest and drink plenty of fluids over the next few days.  Is important that you drink fluids to help push the kidney stone through.  Strain your urine.  The kidney stone will appear about at the size of a grain of sand.  Start the Flomax and take as instructed.  Use the Zofran as needed for nausea and vomiting.  Take the Norco 1 to 2 tablets every 6 hours as needed for pain control.  Do not drive, work, or drink alcohol while taking this medication this is opioid pain medication.  If you are unable to manage the pain at home with the medications prescribed, please go to [facility] for admission.  Follow-up with your urologist as soon as possible.   Patient

## 2024-03-07 ENCOUNTER — TELEPHONE (OUTPATIENT)
Dept: SURGERY | Facility: CLINIC | Age: 54
End: 2024-03-07

## 2024-03-13 ENCOUNTER — OFFICE VISIT (OUTPATIENT)
Dept: SURGERY | Facility: CLINIC | Age: 54
End: 2024-03-13
Payer: COMMERCIAL

## 2024-03-13 ENCOUNTER — TELEPHONE (OUTPATIENT)
Dept: SURGERY | Facility: CLINIC | Age: 54
End: 2024-03-13

## 2024-03-13 DIAGNOSIS — N20.0 NEPHROLITHIASIS: Primary | ICD-10-CM

## 2024-03-13 DIAGNOSIS — N20.0 KIDNEY STONE: Primary | ICD-10-CM

## 2024-03-13 LAB
APPEARANCE: CLEAR
BILIRUBIN: NEGATIVE
GLUCOSE (URINE DIPSTICK): NEGATIVE MG/DL
KETONES (URINE DIPSTICK): NEGATIVE MG/DL
LEUKOCYTES: NEGATIVE
MULTISTIX LOT#: NORMAL NUMERIC
NITRITE, URINE: NEGATIVE
OCCULT BLOOD: NEGATIVE
PH, URINE: 6.5 (ref 4.5–8)
PROTEIN (URINE DIPSTICK): NEGATIVE MG/DL
SPECIFIC GRAVITY: 1.02 (ref 1–1.03)
URINE-COLOR: YELLOW
UROBILINOGEN,SEMI-QN: 0.2 MG/DL (ref 0–1.9)

## 2024-03-13 PROCEDURE — 99204 OFFICE O/P NEW MOD 45 MIN: CPT

## 2024-03-13 PROCEDURE — 81003 URINALYSIS AUTO W/O SCOPE: CPT

## 2024-03-13 RX ORDER — ONDANSETRON HYDROCHLORIDE 8 MG/1
8 TABLET, FILM COATED ORAL EVERY 8 HOURS PRN
Qty: 20 TABLET | Refills: 0 | Status: SHIPPED | OUTPATIENT
Start: 2024-03-13

## 2024-03-13 RX ORDER — KETOROLAC TROMETHAMINE 10 MG/1
10 TABLET, FILM COATED ORAL EVERY 8 HOURS PRN
Qty: 20 TABLET | Refills: 0 | Status: SHIPPED | OUTPATIENT
Start: 2024-03-13

## 2024-03-13 RX ORDER — TAMSULOSIN HYDROCHLORIDE 0.4 MG/1
0.8 CAPSULE ORAL EVERY EVENING
Qty: 180 CAPSULE | Refills: 0 | Status: SHIPPED | OUTPATIENT
Start: 2024-03-13

## 2024-03-13 NOTE — PROGRESS NOTES
[facility]    Urology Consult Note    History of Present Illness:   Patient is a(n) 53 year old male with hx of migraines on sumatriptan, OA, and kidney stones who presents for kidney stone consult.    Pt had an instance of left sided renal colic on 23. CT A/P at the time showed 5mm left UPJ stone with no other left sided stone burden and multiple right sided nonobstructing stones. Sx eventually resolved even though he never caught the stone.     Sx began again recently on 3/6/24 and CT A/P showing 5mm left UVJ stone. No other left sided stone. Similar stone burden on right side as 2023 scan.     Currently still feels left sided pressure. Has been straining religiously without catching stone.    Had stone occurrence in the past and passed on his own. Did not require surg. Fhx of stones in father and brothers. Drinks 2L+ water a day.     HISTORY:  Past Medical History:   Diagnosis Date    Arthritis     Kidney stones     Migraines     Osteoarthritis of knee 2011    Pain in scrotum without trauma of scrotum 2021    Sinus disorder 2016      Past Surgical History:   Procedure Laterality Date    EXCIS LUMBAR DISK,ONE LEVEL      HERNIA SURGERY      KNEE SURGERY      OTHER SURGICAL HISTORY  SINUS SURGERY      Family History   Problem Relation Age of Onset    Cancer Father         Leukemia and Skin    Hypertension Father     Arthritis Father     Cancer Mother         Skin    Hypertension Mother     Migraines Mother     Arthritis Mother     Diabetes Son         Type 1    Migraines Maternal Grandfather     Migraines Brother     Arthritis Maternal Grandmother       Social History:   Social History     Socioeconomic History    Marital status:    Tobacco Use    Smoking status: Former     Types: Cigarettes     Quit date: 2005     Years since quittin.3    Smokeless tobacco: Never    Tobacco comments:     QUIT 9 YEARS AGO   Vaping Use     Vaping Use: Never used   Substance and Sexual Activity    Alcohol use: No     Alcohol/week: 0.0 standard drinks of alcohol    Drug use: No        Allergies  No Known Allergies    Review of Systems:   A 10-point review of systems was completed and is negative other than as noted above.    Physical Exam:   There were no vitals taken for this visit.    GENERAL APPEARANCE: no acute distress  NEUROLOGIC: converses appropriately  HEAD: atraumatic, normocephalic  LUNGS: non-labored breathing  ABDOMEN: soft, nontender, non-distended  BACK: no CVA tenderness  PSYCH: appropriate affect and mood    Results:     Laboratory Data:  Lab Results   Component Value Date    WBC 12.0 (H) 03/06/2024    HGB 16.5 03/06/2024    .0 03/06/2024     Lab Results   Component Value Date     (L) 03/06/2024    K 4.2 03/06/2024     03/06/2024    CO2 26.0 03/06/2024    BUN 16 03/06/2024     (H) 03/06/2024    GFRAA 92 07/24/2018    AST 19 03/06/2024    ALT 31 03/06/2024    TP 7.5 03/06/2024    ALB 4.3 03/06/2024    CA 9.7 03/06/2024       Urinalysis Results (last 3 years):  Recent Labs     05/20/21  1113 07/06/21  1156 03/06/24  1413 03/13/24  1338   COLORUR  --   --  Orange*  --    CLARITY  --   --  Cloudy*  --    SPECGRAVITY 1.022 1.025  --  1.020   PHURINE  --  6.0  --  6.5   NITRITE Negative Negative  --  Negative   WBCUR  --   --  1-5  --    RBCUR  --   --  >10*  --    BACUR  --   --  1+*  --        Urine Culture Results (last 3 years):  Lab Results   Component Value Date    URINECUL No Growth at 18-24 hrs. 03/06/2024       Imaging  CT ABDOMEN+PELVIS KIDNEYSTONE 2D RNDR(NO IV,NO ORAL)(CPT=74176)    Result Date: 3/6/2024  PROCEDURE:  CT ABDOMEN+PELVIS KIDNEYSTONE 2D RNDR(NO IV,NO ORAL)(CPT=74176)  COMPARISON:  [facility], CT, CT ABDOMEN+PELVIS KIDNEYSTONE 2D RNDR(NO IV,NO ORAL)(CPT=74176), 11/18/2023, 4:37 AM.  INDICATIONS:  N/V; left flank pain radiating into testicle-started at 0700; hx of kidney stones  TECHNIQUE:   Unenhanced multislice CT scanning from above the kidneys to below the urinary bladder.  2D rendering are generated on the CT scanner workstation to localize potential stones in the cranio-caudal plane.  Dose reduction techniques were used. Dose information is transmitted to the ACR (American College of Radiology) NRDR (National Radiology Data Registry) which includes the Dose Index Registry.  PATIENT STATED HISTORY: (As transcribed by Technologist)  Patient has left flank pain, nausea and vomiting.    FINDINGS:  LUNG BASES:  Dependent atelectasis bilaterally. LIVER:  Normal in shape and contour but limited evaluation without contrast.  Fat containing lesion within the dome of the liver measuring 0.9 cm.  Additional stable hypodense lesion within the dome of the liver measuring 9 mm. BILIARY:   Gallbladder is unremarkable in appearance.  No intrahepatic biliary dilatation.. SPLEEN:  Normal.  No enlargement or focal lesion. PANCREAS:  No danial-pancreatic inflammatory stranding ADRENALS:  Normal.  No mass or enlargement.  KIDNEYS:  There is mild left-sided hydronephrosis.  There is a 6 mm calculus within the distal left ureter.  Bilateral parapelvic renal cysts.  Nonobstructing right renal calculi. BOWEL/MESENTERY:  Bowel is normal in caliber. No evidence of obstruction.  The appendix is normal in appearance.  Colonic diverticulosis without evidence of diverticulitis. PELVIS:  Prior hernia repair with mesh placement.  The bladder is unremarkable in appearance.  Prostatic calcifications.  Trace free pelvic fluid.   AORTA/VASCULAR:  Aorta is normal in caliber. BONES:  Degenerative changes of the lumbar spine greatest at L5-S1 level.            CONCLUSION:  1. There is a 6 mm calculus within the distal left ureter causing mild left-sided hydronephrosis. 2. Nonobstructing right renal calculi. 3. Colonic diverticulosis without evidence of diverticulitis. 4. Benign appearing lesions within the dome of the liver.     LOCATION:   [facility]   Dictated by (CST): Lior Caban MD on 3/06/2024 at 2:19 PM     Finalized by (CST): Lior Caban MD on 3/06/2024 at 2:22 PM           Impression:   Recommendations:  Kidney stone  - MET vs. Surgical management discussed with pt and benefits and risks of each. Upon reviewing CT images from 11/2024 and comparing to more recent imaging, I believe it is the same stone still present and advised surgery to prevent kidney injury and infxn and he was agreeable.   - surgery ticket placed  - flomax 2 pills daily  - toradol and zofran sent for sx management  - continue to push fluids and avoid constipation   -Discussed that he also has a significant right-sided stone burden and he will consider if he wants to have these taken out preventatively at a later date after the left side  -Recommend 24-hour urine down the road due to recurrence of kidney stones and he was agreeable    Thank you very much for this consult. Please call if there are any questions or concerns.     Paloma Reich PA-C  [facility]  [facility]  Phone: [phone]    Date: 3/13/2024  Time: 2:07 PM

## 2024-03-18 NOTE — TELEPHONE ENCOUNTER
LVM to call back to schedule surgery. Patient can contact me at [phone]  
Please schedule this patient for surgery.    Thank you!    - urine culture 1 wk prior to surgery; order placed  - stent removal with surgeon 1 wk post surgery; needs to schedule    Urology Surgery Request  Surgeon: Dulce or Naila or Marty; earliest available  Location (if known): [facility]  Procedure: Cysto, left RPG, URS, lithotripsy, stone removal, stent placement   Anesthesia: General   Time Frame: earliest avail  Time required: 60 minutes  Diagnosis: kidney stone    Antibiotics: per hospital protocol unless checked below   ___ Levaquin 500 mg IV   ___ Gemcitabine 2 g/100 mL NS bladder instillation to be given in OR    
QTc 457

## 2024-04-13 ENCOUNTER — EKG ENCOUNTER (OUTPATIENT)
Dept: LAB | Age: 54
End: 2024-04-13
Attending: SURGERY
Payer: COMMERCIAL

## 2024-04-13 ENCOUNTER — LABORATORY ENCOUNTER (OUTPATIENT)
Dept: LAB | Age: 54
End: 2024-04-13
Attending: SURGERY
Payer: COMMERCIAL

## 2024-04-13 DIAGNOSIS — N20.0 KIDNEY STONE: ICD-10-CM

## 2024-04-13 PROCEDURE — 93010 ELECTROCARDIOGRAM REPORT: CPT | Performed by: INTERNAL MEDICINE

## 2024-04-13 PROCEDURE — 87086 URINE CULTURE/COLONY COUNT: CPT

## 2024-04-13 PROCEDURE — 93005 ELECTROCARDIOGRAM TRACING: CPT

## 2024-04-14 LAB
ATRIAL RATE: 76 BPM
P AXIS: 58 DEGREES
P-R INTERVAL: 158 MS
Q-T INTERVAL: 372 MS
QRS DURATION: 82 MS
QTC CALCULATION (BEZET): 418 MS
R AXIS: 33 DEGREES
T AXIS: 46 DEGREES
VENTRICULAR RATE: 76 BPM

## 2024-04-15 NOTE — PROGRESS NOTES
Naresh Hanks,    I have reviewed your urine test results. Tests show no significant abnormalities (no signs of infection in the urine). No changes to the plan as we had previously discussed. Please let me know if you have any questions.    Thanks,  Gianluca Yoo MD

## 2024-04-16 ENCOUNTER — MOBILE ENCOUNTER (OUTPATIENT)
Dept: SURGERY | Facility: CLINIC | Age: 54
End: 2024-04-16

## 2024-04-16 ENCOUNTER — TELEPHONE (OUTPATIENT)
Dept: SURGERY | Facility: CLINIC | Age: 54
End: 2024-04-16

## 2024-04-16 DIAGNOSIS — N20.0 KIDNEY STONE: Primary | ICD-10-CM

## 2024-04-16 RX ORDER — KETOROLAC TROMETHAMINE 10 MG/1
10 TABLET, FILM COATED ORAL EVERY 8 HOURS PRN
Qty: 20 TABLET | Refills: 0 | Status: SHIPPED | OUTPATIENT
Start: 2024-04-16

## 2024-04-16 NOTE — TELEPHONE ENCOUNTER
Per pt he is scheduled for a kidney stone procedure 4/29, states his symptoms and pain is worsening. Please advise

## 2024-04-17 ENCOUNTER — LAB ENCOUNTER (OUTPATIENT)
Dept: LAB | Age: 54
End: 2024-04-17
Payer: COMMERCIAL

## 2024-04-17 ENCOUNTER — HOSPITAL ENCOUNTER (OUTPATIENT)
Dept: CT IMAGING | Age: 54
Discharge: HOME | End: 2024-04-17
Payer: COMMERCIAL

## 2024-04-17 DIAGNOSIS — N20.0 KIDNEY STONE: ICD-10-CM

## 2024-04-17 PROCEDURE — 87086 URINE CULTURE/COLONY COUNT: CPT

## 2024-04-17 PROCEDURE — 74176 CT ABD & PELVIS W/O CONTRAST: CPT

## 2024-04-18 NOTE — PROGRESS NOTES
Discussed with pt on phone. Very close to passing stone. He is straining religiously and has not caught the stone. Offered continued MET and cancel surgery but he'd like to proceed with surgery since stone has been in there for awhile. Discussed if he passes stone before surgery we will proceed and try to get additional 4mm renal stone but there is a chance we don't find it. He understands and still would like to proceed with surgery.

## 2024-04-22 ENCOUNTER — ANESTHESIA (OUTPATIENT)
Dept: SURGERY | Facility: HOSPITAL | Age: 54
End: 2024-04-22
Payer: COMMERCIAL

## 2024-04-22 ENCOUNTER — APPOINTMENT (OUTPATIENT)
Dept: GENERAL RADIOLOGY | Facility: HOSPITAL | Age: 54
End: 2024-04-22
Attending: SURGERY
Payer: COMMERCIAL

## 2024-04-22 ENCOUNTER — ANESTHESIA EVENT (OUTPATIENT)
Dept: SURGERY | Facility: HOSPITAL | Age: 54
End: 2024-04-22
Payer: COMMERCIAL

## 2024-04-22 ENCOUNTER — HOSPITAL ENCOUNTER (OUTPATIENT)
Facility: HOSPITAL | Age: 54
Setting detail: HOSPITAL OUTPATIENT SURGERY
Discharge: HOME | End: 2024-04-22
Attending: SURGERY | Admitting: SURGERY
Payer: COMMERCIAL

## 2024-04-22 VITALS
OXYGEN SATURATION: 100 % | HEART RATE: 52 BPM | SYSTOLIC BLOOD PRESSURE: 126 MMHG | BODY MASS INDEX: 27.5 KG/M2 | HEIGHT: 72 IN | DIASTOLIC BLOOD PRESSURE: 85 MMHG | WEIGHT: 203 LBS | RESPIRATION RATE: 18 BRPM | TEMPERATURE: 97 F

## 2024-04-22 DIAGNOSIS — N20.0 KIDNEY STONE: Primary | ICD-10-CM

## 2024-04-22 PROCEDURE — 52352 CYSTOURETERO W/STONE REMOVE: CPT | Performed by: SURGERY

## 2024-04-22 PROCEDURE — BT1F1ZZ FLUOROSCOPY OF LEFT KIDNEY, URETER AND BLADDER USING LOW OSMOLAR CONTRAST: ICD-10-PCS | Performed by: SURGERY

## 2024-04-22 PROCEDURE — 0T778DZ DILATION OF LEFT URETER WITH INTRALUMINAL DEVICE, VIA NATURAL OR ARTIFICIAL OPENING ENDOSCOPIC: ICD-10-PCS | Performed by: SURGERY

## 2024-04-22 PROCEDURE — 0TC18ZZ EXTIRPATION OF MATTER FROM LEFT KIDNEY, VIA NATURAL OR ARTIFICIAL OPENING ENDOSCOPIC: ICD-10-PCS | Performed by: SURGERY

## 2024-04-22 PROCEDURE — 74420 UROGRAPHY RTRGR +-KUB: CPT | Performed by: SURGERY

## 2024-04-22 PROCEDURE — 52356 CYSTO/URETERO W/LITHOTRIPSY: CPT | Performed by: SURGERY

## 2024-04-22 PROCEDURE — 0TC78ZZ EXTIRPATION OF MATTER FROM LEFT URETER, VIA NATURAL OR ARTIFICIAL OPENING ENDOSCOPIC: ICD-10-PCS | Performed by: SURGERY

## 2024-04-22 DEVICE — URETERAL STENT
Type: IMPLANTABLE DEVICE | Site: URETER | Status: FUNCTIONAL
Brand: ASCERTA™

## 2024-04-22 RX ORDER — HYDROMORPHONE HYDROCHLORIDE 1 MG/ML
0.2 INJECTION, SOLUTION INTRAMUSCULAR; INTRAVENOUS; SUBCUTANEOUS EVERY 5 MIN PRN
Status: DISCONTINUED | OUTPATIENT
Start: 2024-04-22 | End: 2024-04-22

## 2024-04-22 RX ORDER — SULFAMETHOXAZOLE AND TRIMETHOPRIM 800; 160 MG/1; MG/1
1 TABLET ORAL 2 TIMES DAILY
Qty: 4 TABLET | Refills: 0 | Status: SHIPPED | OUTPATIENT
Start: 2024-04-22 | End: 2024-04-24

## 2024-04-22 RX ORDER — HYDROCODONE BITARTRATE AND ACETAMINOPHEN 5; 325 MG/1; MG/1
1 TABLET ORAL ONCE AS NEEDED
Status: COMPLETED | OUTPATIENT
Start: 2024-04-22 | End: 2024-04-22

## 2024-04-22 RX ORDER — ACETAMINOPHEN 500 MG
1000 TABLET ORAL ONCE
Status: DISCONTINUED | OUTPATIENT
Start: 2024-04-22 | End: 2024-04-22 | Stop reason: HOSPADM

## 2024-04-22 RX ORDER — LABETALOL HYDROCHLORIDE 5 MG/ML
10 INJECTION, SOLUTION INTRAVENOUS EVERY 10 MIN PRN
Status: DISCONTINUED | OUTPATIENT
Start: 2024-04-22 | End: 2024-04-22

## 2024-04-22 RX ORDER — DEXAMETHASONE SODIUM PHOSPHATE 4 MG/ML
VIAL (ML) INJECTION AS NEEDED
Status: DISCONTINUED | OUTPATIENT
Start: 2024-04-22 | End: 2024-04-22 | Stop reason: SURG

## 2024-04-22 RX ORDER — ACETAMINOPHEN 500 MG
1000 TABLET ORAL ONCE AS NEEDED
Status: COMPLETED | OUTPATIENT
Start: 2024-04-22 | End: 2024-04-22

## 2024-04-22 RX ORDER — LIDOCAINE HYDROCHLORIDE 20 MG/ML
JELLY TOPICAL AS NEEDED
Status: DISCONTINUED | OUTPATIENT
Start: 2024-04-22 | End: 2024-04-22 | Stop reason: HOSPADM

## 2024-04-22 RX ORDER — LIDOCAINE HYDROCHLORIDE 10 MG/ML
INJECTION, SOLUTION EPIDURAL; INFILTRATION; INTRACAUDAL; PERINEURAL AS NEEDED
Status: DISCONTINUED | OUTPATIENT
Start: 2024-04-22 | End: 2024-04-22 | Stop reason: SURG

## 2024-04-22 RX ORDER — ONDANSETRON 2 MG/ML
4 INJECTION INTRAMUSCULAR; INTRAVENOUS EVERY 6 HOURS PRN
Status: DISCONTINUED | OUTPATIENT
Start: 2024-04-22 | End: 2024-04-22

## 2024-04-22 RX ORDER — SODIUM CHLORIDE, SODIUM LACTATE, POTASSIUM CHLORIDE, CALCIUM CHLORIDE 600; 310; 30; 20 MG/100ML; MG/100ML; MG/100ML; MG/100ML
INJECTION, SOLUTION INTRAVENOUS CONTINUOUS
Status: DISCONTINUED | OUTPATIENT
Start: 2024-04-22 | End: 2024-04-22

## 2024-04-22 RX ORDER — ONDANSETRON 2 MG/ML
INJECTION INTRAMUSCULAR; INTRAVENOUS AS NEEDED
Status: DISCONTINUED | OUTPATIENT
Start: 2024-04-22 | End: 2024-04-22 | Stop reason: SURG

## 2024-04-22 RX ORDER — HYDROMORPHONE HYDROCHLORIDE 1 MG/ML
0.6 INJECTION, SOLUTION INTRAMUSCULAR; INTRAVENOUS; SUBCUTANEOUS EVERY 5 MIN PRN
Status: DISCONTINUED | OUTPATIENT
Start: 2024-04-22 | End: 2024-04-22

## 2024-04-22 RX ORDER — SCOLOPAMINE TRANSDERMAL SYSTEM 1 MG/1
1 PATCH, EXTENDED RELEASE TRANSDERMAL ONCE
Status: DISCONTINUED | OUTPATIENT
Start: 2024-04-22 | End: 2024-04-22 | Stop reason: HOSPADM

## 2024-04-22 RX ORDER — PROCHLORPERAZINE EDISYLATE 5 MG/ML
5 INJECTION INTRAMUSCULAR; INTRAVENOUS EVERY 8 HOURS PRN
Status: DISCONTINUED | OUTPATIENT
Start: 2024-04-22 | End: 2024-04-22

## 2024-04-22 RX ORDER — CEFAZOLIN SODIUM/WATER 2 G/20 ML
2 SYRINGE (ML) INTRAVENOUS ONCE
Status: COMPLETED | OUTPATIENT
Start: 2024-04-22 | End: 2024-04-22

## 2024-04-22 RX ORDER — KETOROLAC TROMETHAMINE 30 MG/ML
30 INJECTION, SOLUTION INTRAMUSCULAR; INTRAVENOUS ONCE AS NEEDED
Status: DISCONTINUED | OUTPATIENT
Start: 2024-04-22 | End: 2024-04-22

## 2024-04-22 RX ORDER — HYDROMORPHONE HYDROCHLORIDE 1 MG/ML
0.4 INJECTION, SOLUTION INTRAMUSCULAR; INTRAVENOUS; SUBCUTANEOUS EVERY 5 MIN PRN
Status: DISCONTINUED | OUTPATIENT
Start: 2024-04-22 | End: 2024-04-22

## 2024-04-22 RX ORDER — HYDROCODONE BITARTRATE AND ACETAMINOPHEN 5; 325 MG/1; MG/1
2 TABLET ORAL ONCE AS NEEDED
Status: COMPLETED | OUTPATIENT
Start: 2024-04-22 | End: 2024-04-22

## 2024-04-22 RX ORDER — NALOXONE HYDROCHLORIDE 0.4 MG/ML
80 INJECTION, SOLUTION INTRAMUSCULAR; INTRAVENOUS; SUBCUTANEOUS AS NEEDED
Status: DISCONTINUED | OUTPATIENT
Start: 2024-04-22 | End: 2024-04-22

## 2024-04-22 RX ADMIN — SODIUM CHLORIDE, SODIUM LACTATE, POTASSIUM CHLORIDE, CALCIUM CHLORIDE: 600; 310; 30; 20 INJECTION, SOLUTION INTRAVENOUS at 13:37:00

## 2024-04-22 RX ADMIN — ONDANSETRON 4 MG: 2 INJECTION INTRAMUSCULAR; INTRAVENOUS at 13:50:00

## 2024-04-22 RX ADMIN — DEXAMETHASONE SODIUM PHOSPHATE 8 MG: 4 MG/ML VIAL (ML) INJECTION at 13:50:00

## 2024-04-22 RX ADMIN — SODIUM CHLORIDE, SODIUM LACTATE, POTASSIUM CHLORIDE, CALCIUM CHLORIDE: 600; 310; 30; 20 INJECTION, SOLUTION INTRAVENOUS at 14:49:00

## 2024-04-22 RX ADMIN — LIDOCAINE HYDROCHLORIDE 25 MG: 10 INJECTION, SOLUTION EPIDURAL; INFILTRATION; INTRACAUDAL; PERINEURAL at 13:41:00

## 2024-04-22 RX ADMIN — CEFAZOLIN SODIUM/WATER 2 G: 2 G/20 ML SYRINGE (ML) INTRAVENOUS at 13:46:00

## 2024-04-22 NOTE — H&P
UROLOGY PRE-OPERATIVE HISTORY & PHYSICAL      Demario Orozco Patient Status:  Hospital Outpatient Surgery    1970 MRN [id number]   [facility] SURGERY Attending Gianluca Yoo MD   Hosp Day # 0 PCP Alfred Butt MD     Primary Care Provider: Alfred Butt MD     Chief Complaint:   Nephrolithiasis    History of Present Illness:   49-year-old 3-year-old man with migraines, OA, nephrolithiasis who was found recently to have a 5 mm left UVJ stone.  He has not caught the stone and has been in mild pain.  Repeat CT 2024 shows persistence of a 5 mm left UVJ stone with mild upstream hydroureteronephrosis.  There is an additional 4 mm left lower pole renal stone and 4 mm right interpolar renal stone.    UCx negative on 24.    History:     Past Medical History:    Arthritis    Calculus of kidney    Cancer (HCC)    High blood pressure    High cholesterol    Kidney stones    Migraines    Osteoarthritis of knee    Pain in scrotum without trauma of scrotum    Sinus disorder    Skin cancer    SURGERY       Past Surgical History:   Procedure Laterality Date    Excis lumbar disk,one level      Hernia surgery      X2    Knee surgery      Other surgical history  SINUS SURGERY    Sinus surgery        Spine surgery procedure unlisted         Family History   Problem Relation Age of Onset    Cancer Father         Leukemia and Skin    Hypertension Father     Arthritis Father     Cancer Mother         Skin    Hypertension Mother     Migraines Mother     Arthritis Mother     Diabetes Son         Type 1    Migraines Maternal Grandfather     Migraines Brother     Arthritis Maternal Grandmother        Social History     Socioeconomic History    Marital status:    Tobacco Use    Smoking status: Former     Current packs/day: 0.00     Types: Cigarettes     Quit date: 2005     Years since quittin.4    Smokeless tobacco: Never    Tobacco comments:     QUIT 9 YEARS AGO   Vaping Use    Vaping  status: Never Used   Substance and Sexual Activity    Alcohol use: No     Alcohol/week: 0.0 standard drinks of alcohol    Drug use: No       Medications:  Current Outpatient Medications   Medication Sig Dispense Refill    Fexofenadine HCl (ALLEGRA OR) 180 mg.      tamsulosin 0.4 MG Oral Cap Take 2 capsules (0.8 mg total) by mouth every evening. 180 capsule 0    Ketorolac Tromethamine 10 MG Oral Tab Take 1 tablet (10 mg total) by mouth every 8 (eight) hours as needed for Pain (Use sparingly and with plenty of water). 20 tablet 0    loratadine 10 MG Oral Tab Take 1 tablet (10 mg total) by mouth daily.      OLMESARTAN 20 MG Oral Tab TAKE 1 TABLET(20 MG) BY MOUTH EVERY DAY 90 tablet 0    FLUTICASONE PROPIONATE 50 MCG/ACT Nasal Suspension SHAKE LIQUID AND USE 2 SPRAYS IN EACH NOSTRIL DAILY 16 g 0    atorvastatin 10 MG Oral Tab Take 1 tablet (10 mg total) by mouth nightly.      SUMAtriptan 50 MG Oral Tab TAKE 1 TABLET BY MOUTH EVERY 2 HOURS AS NEEDED FOR MIGRAINE. MAXIMUM DAILY DOSE IS 2 TABLETS 9 tablet 0    Ketorolac Tromethamine 10 MG Oral Tab Take 1 tablet (10 mg total) by mouth every 8 (eight) hours as needed for Pain (Use sparingly and with plenty of water). 20 tablet 0       Allergies:  No Known Allergies    Review of Systems:   A comprehensive 10-point review of systems was completed.  Pertinent positives and negatives are noted in the the HPI.    Physical Exam:   Vital Signs:  Height 6' (1.829 m), weight 200 lb (90.7 kg).     CONSTITUTIONAL: Well developed, well nourished, in no acute distress  NEUROLOGIC: Alert and oriented  HEAD: Normocephalic, atraumatic  EYES: Sclera non-icteric  ENT: Hearing intact, moist mucous membranes  NECK: No obvious goiter or masses  RESPIRATORY: Normal respiratory effort  SKIN: No evident rashes  ABDOMEN: Soft, non-tender, non-distended    Laboratory Data:  Lab Results   Component Value Date    WBC 12.0 (H) 03/06/2024    HGB 16.5 03/06/2024    .0 03/06/2024     Lab Results    Component Value Date     (L) 03/06/2024    K 4.2 03/06/2024     03/06/2024    CO2 26.0 03/06/2024    BUN 16 03/06/2024     (H) 03/06/2024    GFRAA 92 07/24/2018    AST 19 03/06/2024    ALT 31 03/06/2024    TP 7.5 03/06/2024    ALB 4.3 03/06/2024    CA 9.7 03/06/2024       Urinalysis Results (last three years):  Recent Labs     05/20/21  1113 07/06/21  1156 03/06/24  1413 03/13/24  1338   COLORUR  --   --  Orange*  --    CLARITY  --   --  Cloudy*  --    SPECGRAVITY 1.022 1.025  --  1.020   PHURINE  --  6.0  --  6.5   NITRITE Negative Negative  --  Negative   WBCUR  --   --  1-5  --    RBCUR  --   --  >10*  --    BACUR  --   --  1+*  --        Urine Culture Results (last three years):  Lab Results   Component Value Date    URINECUL No Growth 2 Days 04/17/2024    URINECUL No Growth 2 Days 04/13/2024    URINECUL No Growth at 18-24 hrs. 03/06/2024       PSA:  Lab Results   Component Value Date    PSA 1.10 05/20/2021    PSA 0.901 11/16/2019        Imaging (last three days):  No results found.     Assessment:   49-year-old 3-year-old man with migraines, OA, nephrolithiasis who was found recently to have a 5 mm left UVJ stone.  He has not caught the stone and has been in mild pain.  Repeat CT 4/17/2024 shows persistence of a 5 mm left UVJ stone with mild upstream hydroureteronephrosis.  There is an additional 4 mm left lower pole renal stone and 4 mm right interpolar renal stone.    Plan:   - OR for cystoscopy, left ureteroscopy, laser lithotripsy, stent placement  - Informed consent obtained - risks and benefits explained, and all questions answered    I have personally reviewed all relevant medical records, labs, and imaging.     Gianluca Yoo MD  Staff Urologist  [facility]  Office: [phone]

## 2024-04-22 NOTE — DISCHARGE INSTRUCTIONS
You had cystoscopy, ureteroscopy, and stent in the operating room today.    Instructions:    - No heavy lifting or strenuous activity for 1 day. You may resume regular activity tomorrow.  With increased activity you may notice more blood in the urine from stent movement inside the bladder.    - Your follow-up appointment is listed below. Please contact us at [phone] if you need to change your appointment.    Your appointments       Date & Time Appointment Department ([place])    Apr 29, 2024 11:15 AM CDT Procedure with Gianluca Yoo MD [facility] ([place] [address])              [place] [address]  [address]  [facility] [address]  [facility] [address] [phone]           - You have a stent (small plastic tube) inside your kidney and ureter to allow the swelling from surgery to resolve. This is only temporary and must be removed, so you should not forget about it. We will remove your stent via a brief cystoscopy in clinic when you return. If you have to miss this appointment for some reason please make sure you re-schedule it.     - With a ureteral stent in place you may have some discomfort on your side/flank. You can feel pain worsen when you urinate, so try to avoid holding urine and void every 2-3 hours. You also may notice increased blood in the urine as you increase your activity. If this happens increase your hydration and take it easy for a day. Warm baths/hot packs can help with the discomfort as well as your prescribed medications and Advil/Motrin (if you are able to take these).     - You may experience mild pain after the procedure for a few days.  If the pain becomes intolerable please contact our office or go to the nearest Emergency Room or Urgent Care. You should take over the counter ibuprofen (AKA motrin, advil) for mild pain (provided you do not have a medical condition such as stomach ulcers or  kidney disease which prohibits you from taking these). You may alternate this with tylenol as well. If pain is still not relieved by tylenol and/or ibuprofen, you may take narcotic pain medication if prescribed (typically oxycodone or tramadol). If you are taking narcotic pain medication this can make you constipated, so you should take over the counter stool softeners or miralax if prescribed.    - Warm packs over the lower abdomen or hot baths often help with discomfort after cystoscopy.    - If you take blood thinners (such as aspirin or plavix) please hold these medications until 3 days after surgery.     - You may experience burning and frequency of urination over the next few days. This will improve after a few days if you stay well hydrated.      - You are likely to see some blood in your urine (pink or light red urine) that should clear up within a few days. Staying well hydrated should help this clear up. If you notice the urine stays dark red or there are multiple large blood clots despite good hydration, please call the urology clinic ([phone]).     - Try to abstain from alcohol, coffee, tea, artificial sweeteners, and spicy food for the next 48 hours as these can irritate the bladder.     - If you develop fevers / chills, difficulty urinating, or abdominal pain that does not improve with pain medications, please call the office.     - Drink 1.5 to 2 liters of fluid today (water is preferable). If you are on a fluid restriction due to other medical reasons then you need to adhere to your fluid restriction recommendations.    Gianluca Yoo MD  Staff Urologist  [facility]  Office: [phone]

## 2024-04-22 NOTE — ANESTHESIA PREPROCEDURE EVALUATION
PRE-OP EVALUATION    Patient Name: Demario Orozco    Admit Diagnosis: Kidney stone [N20.0]    Pre-op Diagnosis: Kidney stone [N20.0]    CYSTOSCOPY, LEFT RETROGRADE PYLEOGRAM, URETEROSCOPY, LASER LITHOTRIPSY, STONE REMOVAL, STENT PLACEMENT    Anesthesia Procedure: CYSTOSCOPY, LEFT RETROGRADE PYLEOGRAM, URETEROSCOPY, LASER LITHOTRIPSY, STONE REMOVAL, STENT PLACEMENT (Left)    Surgeons and Role:     * Gianluca Yoo MD - Primary    Pre-op vitals reviewed.  Temp: 97.8 °F (36.6 °C)  Pulse: 61  Resp: 16  BP: 118/71  SpO2: 97 %  Body mass index is 27.53 kg/m².    Current medications reviewed.  Hospital Medications:   [Transfer Hold] acetaminophen (Tylenol Extra Strength) tab 1,000 mg  1,000 mg Oral Once    [Transfer Hold] scopolamine (Transderm-Scop) 1 MG/3DAYS patch 1 patch  1 patch Transdermal Once    lactated ringers infusion   Intravenous Continuous    ceFAZolin (Ancef) 2 g in 20mL IV syringe premix  2 g Intravenous Once       Outpatient Medications:     Medications Prior to Admission   Medication Sig Dispense Refill Last Dose    Fexofenadine HCl (ALLEGRA OR) 180 mg.   2024    tamsulosin 0.4 MG Oral Cap Take 2 capsules (0.8 mg total) by mouth every evening. 180 capsule 0 Past Week    Ketorolac Tromethamine 10 MG Oral Tab Take 1 tablet (10 mg total) by mouth every 8 (eight) hours as needed for Pain (Use sparingly and with plenty of water). 20 tablet 0 Past Week    [] ondansetron 4 MG Oral Tablet Dispersible Take 1 tablet (4 mg total) by mouth every 4 (four) hours as needed for Nausea. 10 tablet 0     [] HYDROcodone-acetaminophen 5-325 MG Oral Tab Take 1-2 tablets by mouth every 6 (six) hours as needed for Pain. 30 tablet 0     loratadine 10 MG Oral Tab Take 1 tablet (10 mg total) by mouth daily.   Past Week    OLMESARTAN 20 MG Oral Tab TAKE 1 TABLET(20 MG) BY MOUTH EVERY DAY 90 tablet 0 2024    FLUTICASONE PROPIONATE 50 MCG/ACT Nasal Suspension SHAKE LIQUID AND USE 2 SPRAYS IN EACH NOSTRIL  DAILY 16 g 0 2024    atorvastatin 10 MG Oral Tab Take 1 tablet (10 mg total) by mouth nightly.   2024    SUMAtriptan 50 MG Oral Tab TAKE 1 TABLET BY MOUTH EVERY 2 HOURS AS NEEDED FOR MIGRAINE. MAXIMUM DAILY DOSE IS 2 TABLETS 9 tablet 0 Past Week    Ketorolac Tromethamine 10 MG Oral Tab Take 1 tablet (10 mg total) by mouth every 8 (eight) hours as needed for Pain (Use sparingly and with plenty of water). 20 tablet 0        Allergies: Patient has no known allergies.      Anesthesia Evaluation    Patient summary reviewed.    Anesthetic Complications           GI/Hepatic/Renal    Negative GI/hepatic/renal ROS.                             Cardiovascular    Negative cardiovascular ROS.    Exercise tolerance: good     MET: >4      (+) hypertension   (+) hyperlipidemia                                  Endo/Other                           (+) arthritis       Pulmonary    Negative pulmonary ROS.                       Neuro/Psych        (+) anxiety         (+) neuromuscular disease  (+) headaches                   Past Surgical History:   Procedure Laterality Date    Excis lumbar disk,one level      Hernia surgery      X2    Knee surgery      Other surgical history  SINUS SURGERY    Sinus surgery        Spine surgery procedure unlisted       Social History     Socioeconomic History    Marital status:    Tobacco Use    Smoking status: Former     Current packs/day: 0.00     Types: Cigarettes     Quit date: 2005     Years since quittin.4    Smokeless tobacco: Never    Tobacco comments:     QUIT 9 YEARS AGO   Vaping Use    Vaping status: Never Used   Substance and Sexual Activity    Alcohol use: No     Alcohol/week: 0.0 standard drinks of alcohol    Drug use: No     History   Drug Use No     Available pre-op labs reviewed.  Lab Results   Component Value Date    WBC 12.0 (H) 2024    RBC 5.47 2024    HGB 16.5 2024    HCT 47.0 2024    MCV 85.9 2024    MCH 30.2 2024     MCHC 35.1 03/06/2024    RDW 11.5 03/06/2024    .0 03/06/2024     Lab Results   Component Value Date     (L) 03/06/2024    K 4.2 03/06/2024     03/06/2024    CO2 26.0 03/06/2024    BUN 16 03/06/2024    CREATSERUM 1.20 03/06/2024     (H) 03/06/2024    CA 9.7 03/06/2024            Airway      Mallampati: II  Mouth opening: >3 FB  TM distance: > 6 cm  Neck ROM: full Cardiovascular    Cardiovascular exam normal.  Rhythm: regular  Rate: normal  (-) murmur   Dental    Dentition appears grossly intact         Pulmonary    Pulmonary exam normal.  Breath sounds clear to auscultation bilaterally.               Other findings              ASA: 2   Plan: general  NPO status verified and patient meets guidelines.        Comment: GA discussed.  Risk of PONV, cough, sore throat explained.  All questions answered.    Plan/risks discussed with: patient and spouse                Present on Admission:  **None**

## 2024-04-22 NOTE — OPERATIVE REPORT
Urology Operative Note    Attending Surgeon: Gianluca Yoo MD    Assistant Surgeon: None    Patient Name: Demario Orozco    Date of Surgery: 4/22/2024    Preoperative Diagnosis: Left nephrolithiasis    Postoperative Diagnosis: Same    Procedure Performed:   Cystoscopy, LEFT ureteroscopy (ureter), laser lithotripsy, basket stone extraction, retrograde pyelogram, ureteral stent placement  Cystoscopy, LEFT ureteroscopy (kidney), basket stone extraction, retrograde pyelogram, ureteral stent placement    Indication:  Patient is a 53 year old male who presented with a 6mm obstructing distal left ureteral stone and a 4mm non-obstructing left renal stone. The patient was counseled on options, risks, and benefits and elected to undergo the above procedure. We discussed risks including, but not limited to, bleeding, infection, damage to surrounding structures, need for repeat procedure(s). The patient understood these risks and wished to proceed with surgery.    Findings:  Cystoscopy - normal urethra without strictures, normal bladder. Stone(s) in distal ureter and kidney fully treated.    Procedure:  The patient was taken to the operating room and a timeout was performed confirming the correct patient and procedure. The patient was prepped and draped in lithotomy position after undergoing general anesthesia. Pre-operative prophylactic antibiotics were given in the form of Cefazolin.    The cystoscope was inserted per urethra and the bladder was inspected and drained. The left ureter was cannulated with a Sensor wire, and the wire was passed into the renal pelvis which I confirmed using fluoroscopy.     Then, the cystoscope was removed and I inserted a semirigid ureteroscope alongside the wire and into the ureter. Stone was seen in the distal ureter. The stone was fragmented using a laser, and then the fragments were extracted using a zero tip basket. The stones were dropped in the bladder and later removed through the  cystoscope.    The scope was advanced up the ureter until resistance was met, and at this point no stone was seen within the ureter. I inserted a second Sensor wire through the scope into the renal pelvis, and confirmed fluoroscopically. The scope was removed.     A 11/13 Yoruba ureteral access sheath was inserted over the first wire. It was advanced without resistance to the mid ureter. The wire and inner cannula of the access sheath were removed, leaving the safety wire in place alongside the access sheath.     The flexible ureteroscope was advanced into the sheath and up into the renal pelvis. Only a single stone was seen in the lower pole. The stone was extracted using a zero tip basket. enrique using a zero tip basket. A retrograde pyelogram was performed through the scope and showed no extravasation. The ureter was inspected while slowly removing the scope and access sheath, and it appeared healthy without stone fragments seen.    A 6-Yoruba x 26 cm JJ ureteral stent was inserted over the remaining wire. The proximal coil was formed in the kidney under fluoroscopic guidance. The distal coil was formed within the bladder under direct cystoscopic visualization. The stent string was removed prior to stent placement.    The bladder was drained and the cystoscope was removed. The patient was awoken from anesthesia and transferred to PACU in stable condition. The patient tolerated the procedure well. All instrument/supply counts were correct at the end of the case.    Specimens:   Stone fragments for chemical analysis    Estimated Blood Loss:  1 mL    Tubes/Drains:  6-Yoruba x 26 cm JJ left ureteral stent    Complications:   None immediate    Condition from OR:  Stable    Plan:   The patient will follow up in the office for stent removal in 1 week.      Gianluca Yoo MD  Staff Urologist  [facility]  Office: [phone]

## 2024-04-22 NOTE — ANESTHESIA PROCEDURE NOTES
Airway  Date/Time: 4/22/2024 1:42 PM  Urgency: elective      General Information and Staff    Patient location during procedure: OR  Anesthesiologist: Joe Lamar MD  Performed: anesthesiologist   Performed by: Joe Lamar MD  Authorized by: Joe Lamar MD      Indications and Patient Condition  Indications for airway management: anesthesia  Sedation level: deep  Preoxygenated: yes  Patient position: sniffing  Mask difficulty assessment: 1 - vent by mask    Final Airway Details  Final airway type: supraglottic airway      Successful airway: classic  Size 4       Number of attempts at approach: 1

## 2024-04-22 NOTE — ANESTHESIA POSTPROCEDURE EVALUATION
[facility]    Demario Orozco Patient Status:  Hospital Outpatient Surgery   Age/Gender 53 year old male MRN [id number]   Location [facility] POST ANESTHESIA CARE UNIT Attending Gianluca Yoo MD   Hosp Day # 0 PCP Alfred Butt MD       Anesthesia Post-op Note    CYSTOSCOPY, LEFT RETROGRADE PYLEOGRAM, URETEROSCOPY, LASER LITHOTRIPSY, STONE REMOVAL, STENT PLACEMENT    Procedure Summary       Date: 04/22/24 Room / Location:  MAIN OR 07 / [facility] MAIN OR    Anesthesia Start: 1337 Anesthesia Stop: 1449    Procedure: CYSTOSCOPY, LEFT RETROGRADE PYLEOGRAM, URETEROSCOPY, LASER LITHOTRIPSY, STONE REMOVAL, STENT PLACEMENT (Left) Diagnosis:       Kidney stone      (Kidney stone [N20.0])    Surgeons: Gianluca Yoo MD Anesthesiologist: Joe Lamar MD    Anesthesia Type: general ASA Status: 2            Anesthesia Type: general    Vitals Value Taken Time   /85 04/22/24 1448   Temp 96.9 04/22/24 1450   Pulse 63 04/22/24 1449   Resp 18 04/22/24 1449   SpO2 100 % 04/22/24 1449   Vitals shown include unfiled device data.    Patient Location: PACU    Anesthesia Type: general    Airway Patency: patent    Postop Pain Control: adequate    Mental Status: preanesthetic baseline    Nausea/Vomiting: none    Cardiopulmonary/Hydration status: stable euvolemic    Complications: no apparent anesthesia related complications    Postop vital signs: stable    Dental Exam: Unchanged from Preop    Patient to be discharged from PACU when criteria met.

## 2024-04-29 ENCOUNTER — TELEPHONE (OUTPATIENT)
Dept: SURGERY | Facility: CLINIC | Age: 54
End: 2024-04-29

## 2024-04-29 ENCOUNTER — PROCEDURE (OUTPATIENT)
Dept: SURGERY | Facility: CLINIC | Age: 54
End: 2024-04-29
Payer: COMMERCIAL

## 2024-04-29 VITALS — DIASTOLIC BLOOD PRESSURE: 73 MMHG | SYSTOLIC BLOOD PRESSURE: 110 MMHG

## 2024-04-29 DIAGNOSIS — R82.90 URINE FINDING: Primary | ICD-10-CM

## 2024-04-29 DIAGNOSIS — N20.0 NEPHROLITHIASIS: ICD-10-CM

## 2024-04-29 LAB
APPEARANCE: CLEAR
GLUCOSE (URINE DIPSTICK): 250 MG/DL
MULTISTIX LOT#: ABNORMAL NUMERIC
NITRITE, URINE: POSITIVE
PH, URINE: 5 (ref 4.5–8)
PROTEIN (URINE DIPSTICK): >=300 MG/DL
SPECIFIC GRAVITY: 1.01 (ref 1–1.03)
UROBILINOGEN,SEMI-QN: 4 MG/DL (ref 0–1.9)

## 2024-04-29 PROCEDURE — 52310 CYSTOSCOPY AND TREATMENT: CPT | Performed by: SURGERY

## 2024-04-29 PROCEDURE — 3078F DIAST BP <80 MM HG: CPT | Performed by: SURGERY

## 2024-04-29 PROCEDURE — 3074F SYST BP LT 130 MM HG: CPT | Performed by: SURGERY

## 2024-04-29 PROCEDURE — 81003 URINALYSIS AUTO W/O SCOPE: CPT | Performed by: SURGERY

## 2024-04-29 RX ORDER — CIPROFLOXACIN 500 MG/1
500 TABLET, FILM COATED ORAL ONCE
Status: COMPLETED | OUTPATIENT
Start: 2024-04-29 | End: 2024-04-29

## 2024-04-29 RX ORDER — SULFAMETHOXAZOLE AND TRIMETHOPRIM 800; 160 MG/1; MG/1
1 TABLET ORAL 2 TIMES DAILY
Qty: 14 TABLET | Refills: 0 | Status: SHIPPED | OUTPATIENT
Start: 2024-04-29 | End: 2024-05-06

## 2024-04-29 RX ADMIN — CIPROFLOXACIN 500 MG: 500 TABLET, FILM COATED ORAL at 12:09:00

## 2024-04-29 NOTE — TELEPHONE ENCOUNTER
Patient was seen in clinic today and asking if he should continue taking medication flomax. Please call at [phone],thanks.

## 2024-04-29 NOTE — PROCEDURES
Clinic Procedure Note    INDICATIONS:   53-year-old man with migraines, OA, nephrolithiasis who was found recently to have a 5 mm left UVJ stone.  He has not caught the stone and has been in mild pain.  Repeat CT 2024 shows persistence of a 5 mm left UVJ stone with mild upstream hydroureteronephrosis.  There is an additional 4 mm left lower pole renal stone and 4 mm right interpolar renal stone.     I brought him to the OR on 2024 for left ureteroscopy, laser lithotripsy, stone removal, stent placement.  Stones in distal ureter and kidney were fully treated.    Urinalysis shows positive nitrates and leukocyte esterase today, but he has been taking Azo.  He denies fevers or chills.  He denies current dysuria.    Stone analysis pending.    PROCEDURE:       1. Flexible cystoscopy, removal of left ureteral stent (75031)    DATE OF PROCEDURE: 2024     PRE-PROCEDURE DIAGNOSIS: Nephrolithiasis    POST-PROCEDURE DIAGNOSIS: Same     SURGEON: Gianluca Yoo MD    FINDINGS:  Stent successfully removed in its entirety.     PROCEDURE:   Patient was brought to the procedure suite and a time-out was performed identifiying the patient,  and procedure to be performed. The risks and benefits of the procedure were once again discussed with the patient including bleeding, infection, and dysuria. The patient agreed to proceed. The patient did not have any signs or symptoms of active UTI. Prophylactic PO antibiotics were given in clinic.    The patient was placed in supine position on the table and the genital area was prepped and draped in the standard sterile fashion. Urojet was used prior for local anesthesia effect. A flexible cystoscope was inserted per urethra. There were no urethral strictures present. The bladder was entered and the distal coil of the stent was seen protruding from the ureteral orifice. The stent was grasped with the flexible stent grasper, and then the stent and cystoscope were both removed. The  stent was visualized outside the body and confirmed to be intact and fully removed.     There were no complications and the patient tolerated the procedure well.    IMPRESSION:  Successful stent removal after ureteroscopy today.    PLAN:   -Renal/bladder ultrasound in 6 weeks to ensure no silent hydronephrosis  -24 hour urine study for metabolic workup  -Start empiric Bactrim, can stop if urine culture negative    Gianluca Yoo MD  Staff Urologist  [facility]  Office: [phone]

## 2024-05-02 LAB
CAOX DIHYDRATE: 40 %
CAOX MONOHYDRATE: 60 %
WEIGHT-STONE: 40 MG

## 2024-05-29 ENCOUNTER — HOSPITAL ENCOUNTER (OUTPATIENT)
Dept: ULTRASOUND IMAGING | Age: 54
Discharge: HOME | End: 2024-05-29
Attending: SURGERY
Payer: COMMERCIAL

## 2024-05-29 DIAGNOSIS — N20.0 NEPHROLITHIASIS: ICD-10-CM

## 2024-05-29 PROCEDURE — 76770 US EXAM ABDO BACK WALL COMP: CPT | Performed by: SURGERY

## 2024-08-12 ENCOUNTER — VIRTUAL PHONE E/M (OUTPATIENT)
Dept: SURGERY | Facility: CLINIC | Age: 54
End: 2024-08-12

## 2024-08-12 DIAGNOSIS — N20.0 NEPHROLITHIASIS: Primary | ICD-10-CM

## 2024-08-12 NOTE — PROGRESS NOTES
Urology Clinic Note  Telemedicine Visit  Audio Only  The patient consented to performing this visit virtually.     Primary Care Provider:  Alfred Butt MD     Chief Complaint:   Nephrolithiasis    HPI:   53-year-old man with migraines, OA, nephrolithiasis who was found recently to have a 5 mm left UVJ stone.  He has not caught the stone and has been in mild pain.  Repeat CT 4/17/2024 shows persistence of a 5 mm left UVJ stone with mild upstream hydroureteronephrosis.  There is an additional 4 mm left lower pole renal stone and 4 mm right interpolar renal stone.      I brought him to the OR on 4/22/2024 for left ureteroscopy, laser lithotripsy, stone removal, stent placement.  Stones in distal ureter and kidney were fully treated.  Stent removed in clinic 4/29/2024.     Stone analysis: 60% COM, 40% COD     Renal US shows no hydronephrosis, 2.7cm right simple renal cyst, stable small right non-obstructing renal stone (4mm on last CT).     He is feeling well this time with no abdominal or flank pain.      PSA:  Lab Results   Component Value Date    PSA 1.10 05/20/2021    PSA 0.901 11/16/2019        History:     Past Medical History:    Arthritis    Calculus of kidney    Cancer (HCC)    High blood pressure    High cholesterol    Kidney stones    Migraines    Osteoarthritis of knee    Pain in scrotum without trauma of scrotum    Sinus disorder    Skin cancer    SURGERY       Past Surgical History:   Procedure Laterality Date    Excis lumbar disk,one level      Hernia surgery      X2    Knee surgery      Other surgical history  SINUS SURGERY    Sinus surgery        Spine surgery procedure unlisted         Family History   Problem Relation Age of Onset    Cancer Father         Leukemia and Skin    Hypertension Father     Arthritis Father     Cancer Mother         Skin    Hypertension Mother     Migraines Mother     Arthritis Mother     Diabetes Son         Type 1    Migraines Maternal Grandfather     Migraines Brother      Arthritis Maternal Grandmother        Social History     Socioeconomic History    Marital status:    Tobacco Use    Smoking status: Former     Current packs/day: 0.00     Types: Cigarettes     Quit date: 2005     Years since quittin.7    Smokeless tobacco: Never    Tobacco comments:     QUIT 9 YEARS AGO   Vaping Use    Vaping status: Never Used   Substance and Sexual Activity    Alcohol use: No     Alcohol/week: 0.0 standard drinks of alcohol    Drug use: No       Medications (Active prior to today's visit):  Current Outpatient Medications   Medication Sig Dispense Refill    Ketorolac Tromethamine 10 MG Oral Tab Take 1 tablet (10 mg total) by mouth every 8 (eight) hours as needed for Pain (Use sparingly and with plenty of water). 20 tablet 0    Fexofenadine HCl (ALLEGRA OR) 180 mg.      tamsulosin 0.4 MG Oral Cap Take 2 capsules (0.8 mg total) by mouth every evening. 180 capsule 0    Ketorolac Tromethamine 10 MG Oral Tab Take 1 tablet (10 mg total) by mouth every 8 (eight) hours as needed for Pain (Use sparingly and with plenty of water). 20 tablet 0    loratadine 10 MG Oral Tab Take 1 tablet (10 mg total) by mouth daily.      OLMESARTAN 20 MG Oral Tab TAKE 1 TABLET(20 MG) BY MOUTH EVERY DAY 90 tablet 0    FLUTICASONE PROPIONATE 50 MCG/ACT Nasal Suspension SHAKE LIQUID AND USE 2 SPRAYS IN EACH NOSTRIL DAILY 16 g 0    atorvastatin 10 MG Oral Tab Take 1 tablet (10 mg total) by mouth nightly.      SUMAtriptan 50 MG Oral Tab TAKE 1 TABLET BY MOUTH EVERY 2 HOURS AS NEEDED FOR MIGRAINE. MAXIMUM DAILY DOSE IS 2 TABLETS 9 tablet 0       Allergies:  No Known Allergies    Review of Systems:   A comprehensive 10-point review of systems was completed.  Pertinent positives and negatives are noted in the the HPI.    Physical Exam:   No physical exam performed during this telephone encounter.    Assessment & Plan:   53-year-old man with migraines, OA, nephrolithiasis who was found recently to have a 5 mm left  UVJ stone.  He has not caught the stone and has been in mild pain.  Repeat CT 4/17/2024 shows persistence of a 5 mm left UVJ stone with mild upstream hydroureteronephrosis.  There is an additional 4 mm left lower pole renal stone and 4 mm right interpolar renal stone.      I brought him to the OR on 4/22/2024 for left ureteroscopy, laser lithotripsy, stone removal, stent placement.  Stones in distal ureter and kidney were fully treated.  Stent removed in clinic 4/29/2024.     Stone analysis: 60% COM, 40% COD     Renal US shows no hydronephrosis, 2.7cm right simple renal cyst, stable small right non-obstructing renal stone (4mm on last CT).     He is feeling well this time with no abdominal or flank pain.    -Renal ultrasound in 1 year    In total, 5 minutes were spent on this patient encounter for medical discussion.    Gianluca Yoo MD  Staff Urologist  [facility]  Office: [phone]

## (undated) DEVICE — ADAPTER BX SEAL PRT ADJ FOR HYSTEROSCOPE

## (undated) DEVICE — SLEEVE COMPR MD KNEE LEN SGL USE KENDALL SCD

## (undated) DEVICE — SYSTEM PUMPING SINGLE ACTION

## (undated) DEVICE — SYRINGE MED 20ML STD CLR PLAS LL TIP N CTRL

## (undated) DEVICE — PACK PBDS CYSTOSCOPY

## (undated) DEVICE — NITINOL WIRE WITH HYDROPHILIC TIP: Brand: SENSOR

## (undated) DEVICE — NITINOL STONE RETRIEVAL BASKET: Brand: ZERO TIP

## (undated) DEVICE — SOLUTION IRRIG 3000ML 0.9% NACL FLX CONT

## (undated) DEVICE — FIBER LSR 200UM 2J 80HZ 60W DL FOR LITHO

## (undated) DEVICE — GLOVE SUR 7 SENSICARE PI PIP CRM PWD F

## (undated) DEVICE — URETERAL ACCESS SHEATH SET: Brand: NAVIGATOR HD

## (undated) NOTE — ED AVS SNAPSHOT
Gauri Brand   MRN: [id number]    Department:  [facility] in [place]   Date of Visit:  7/24/2018           Disclosure     Insurance plans vary and the physician(s) referred by the ER may not be covered by your plan.  Please con tell this physician (or your personal doctor if your instructions are to return to your personal doctor) about any new or lasting problems. The primary care or specialist physician will see patients referred from the [facility] Emergency Department.  Chin Harrell